# Patient Record
Sex: MALE | Race: OTHER | HISPANIC OR LATINO | URBAN - METROPOLITAN AREA
[De-identification: names, ages, dates, MRNs, and addresses within clinical notes are randomized per-mention and may not be internally consistent; named-entity substitution may affect disease eponyms.]

---

## 2019-12-27 ENCOUNTER — EMERGENCY (EMERGENCY)
Facility: HOSPITAL | Age: 29
LOS: 1 days | Discharge: HOME | End: 2019-12-27
Attending: EMERGENCY MEDICINE
Payer: MEDICAID

## 2019-12-27 VITALS — HEIGHT: 71 IN | WEIGHT: 190.04 LBS

## 2019-12-27 VITALS
HEART RATE: 69 BPM | RESPIRATION RATE: 18 BRPM | OXYGEN SATURATION: 99 % | TEMPERATURE: 98 F | DIASTOLIC BLOOD PRESSURE: 80 MMHG | SYSTOLIC BLOOD PRESSURE: 131 MMHG

## 2019-12-27 PROCEDURE — 99282 EMERGENCY DEPT VISIT SF MDM: CPT

## 2019-12-27 NOTE — ED STATDOCS - OBJECTIVE STATEMENT
28 y/o M pt with no significant PMHx presents to the ED c/o left knee pain s/p knee sprain playing soccer a couple months ago. Patient had an X-ray done and was recommended PT. Patient states it worked but reports hurting his knee again recently when he missed a step. Patient has an appt with a specialist next year but states he has increasing pain that is radiating to his lower back.

## 2019-12-27 NOTE — ED STATDOCS - PATIENT PORTAL LINK FT
You can access the FollowMyHealth Patient Portal offered by [facility] by registering at the following website: [email or web address]. By joining [employer]’s FollowMyHealth portal, you will also be able to view your health information using other applications (apps) compatible with our system.

## 2019-12-27 NOTE — ED STATDOCS - PHYSICAL EXAMINATION
Gen: NAD, AOx3  Head: NCAT  HEENT: PERRL, EOMI, oral mucosa moist, normal conjunctiva, neck supple  Lung: CTAB, no respiratory distress  CV: rrr, no murmur, Normal perfusion  Abd: soft, NTND, no CVA tenderness  MSK: No edema, no visible deformities  Neuro: No focal neurologic deficits  Skin: No rash   Psych: normal affect Gen: NAD, AOx3  Head: NCAT  HEENT: EOMI, oral mucosa moist, normal conjunctiva, neck supple  Lung: no respiratory distress  CV:  Normal perfusion  MSK: No edema, no visible deformities, rt knee +lateral joint line ttp without effusion FROM mild antalgic gait  Neuro: No focal neurologic deficits  Skin: No rash   Psych: normal affect

## 2019-12-27 NOTE — ED STATDOCS - NSFOLLOWUPINSTRUCTIONS_ED_ALL_ED_FT
1. Return to ED for worsening, progressive or any other concerning symptoms   2. Follow up with your primary care doctor in 2-3days   3. Take motrin 600mg every 6 hours as needed for pain and Take Tylenol up to 1000 mg every 6 hours as needed for pain.   4. Rest, apply ice over covered skin for no more than 15 minutes at a time, keep affected extremity elevated  5. Follow up with orthopedic for possible MRI

## 2019-12-27 NOTE — ED ADULT TRIAGE NOTE - CHIEF COMPLAINT QUOTE
Right knee pain for past several weeks, was seen in urgent care and received x-ray, was told it was a sprain, states worsening pain, follow up appointment with Dr Hernandez not for another couple weeks

## 2019-12-27 NOTE — ED STATDOCS - NS_ ATTENDINGSCRIBEDETAILS _ED_A_ED_FT
I, Carmen Curtis, performed the initial face to face bedside interview with this patient regarding history of present illness, review of symptoms and relevant past medical, social and family history.  I completed an independent physical examination.  The history, relevant review of systems, past medical and surgical history, medical decision making, and physical examination was documented by the scribe in my presence and I attest to the accuracy of the documentation.

## 2019-12-27 NOTE — ED STATDOCS - CLINICAL SUMMARY MEDICAL DECISION MAKING FREE TEXT BOX
chronic knee pain with recent re-injury, no concerning exam findings, has outpt ortho apt. has not been taking motrin/tylenol, now with back pain from limping. neuro intact. recommend motrin/tylenol/ice and continued outpt Follow up

## 2020-01-10 PROBLEM — Z00.00 ENCOUNTER FOR PREVENTIVE HEALTH EXAMINATION: Status: ACTIVE | Noted: 2020-01-10

## 2020-01-13 ENCOUNTER — APPOINTMENT (OUTPATIENT)
Dept: ORTHOPEDIC SURGERY | Facility: CLINIC | Age: 30
End: 2020-01-13
Payer: MEDICAID

## 2020-01-13 VITALS
SYSTOLIC BLOOD PRESSURE: 130 MMHG | BODY MASS INDEX: 26.6 KG/M2 | DIASTOLIC BLOOD PRESSURE: 77 MMHG | HEIGHT: 71 IN | HEART RATE: 71 BPM | WEIGHT: 190 LBS | TEMPERATURE: 98.1 F

## 2020-01-13 DIAGNOSIS — M25.561 PAIN IN RIGHT KNEE: ICD-10-CM

## 2020-01-13 DIAGNOSIS — Z78.9 OTHER SPECIFIED HEALTH STATUS: ICD-10-CM

## 2020-01-13 PROCEDURE — 99204 OFFICE O/P NEW MOD 45 MIN: CPT

## 2020-01-13 PROCEDURE — 73562 X-RAY EXAM OF KNEE 3: CPT | Mod: RT

## 2020-01-13 NOTE — PHYSICAL EXAM
[Normal] : Gait: normal [LE] : Sensory: Intact in bilateral lower extremities [ALL] : Biceps, brachioradialis, triceps, patellar, ankle and plantar 2+ and symmetric bilaterally [Antalgic] : not antalgic [de-identified] : Right knee exam shows mild joint effusion he has full range of motion 0-140°with pain at terminal flexion he has laxity with anterior drawer test, positive Lachman's test.  [de-identified] : GENERAL APPEARANCE: Well nourished and hydrated, pleasant, alert, and oriented x 3. Appears their stated age. \par HEENT: Normocephalic, extraocular eye motion intact. Nasal septum midline. Oral cavity clear. External auditory canal clear. \par RESPIRATORY: Breath sounds clear and audible in all lobes. No wheezing, No accessory muscle use.\par CARDIOVASCULAR: No apparent abnormalities. No lower leg edema. No varicosities. Pedal pulses are palpable.\par NEUROLOGIC: Sensation is normal, no muscle weakness in the upper or lower extremities.\par DERMATOLOGIC: No apparent skin lesions, moist, warm, no rash.\par SPINE: Cervical spine appears normal and moves freely; thoracic spine appears normal and moves freely; lumbosacral spine appears normal and moves freely, normal, nontender.\par MUSCULOSKELETAL: Hands, wrists, and elbows are normal and move freely, shoulders are normal and move freely.  [de-identified] : 3V Xray of the right knee done in office today and reviewed by Dr. Emiliano Hernandez shows well-preserved joint without findings or any evidence of fracture, dislocation, or any other acute orthopedic pathology. There is no radiographic features of severe OA present. \par \par \par

## 2020-01-13 NOTE — ADDENDUM
[FreeTextEntry1] : I, Ilya Mcgraw, acted solely as a scribe for Dr. Emiliano Hernandez on this date 01/13/2020.

## 2020-01-13 NOTE — DISCUSSION/SUMMARY
[de-identified] : Based on his physical exam findings and history of injury we will obtain an MRI of the right knee to rule-out ACL tear. Based on MRI result he may be a candidate for right knee ACL reconstruction. He will follow-up with Dr. Lan after MRI result is available.

## 2020-01-13 NOTE — HISTORY OF PRESENT ILLNESS
[4] : a current pain level of 4/10 [___ mths] : [unfilled] month(s) ago [Bending] : worsened by bending [Rest] : relieved by rest [Pain Location] : pain [Stable] : stable [Standing] : standing [Intermit.] : ~He/She~ states the symptoms seem to be intermittent [Walking] : worsened by walking [Knee Flexion] : worsened with knee flexion [Knee Extension] : worsened with knee extension [Acetaminophen] : relieved by acetaminophen [de-identified] : 29 year old male here for evaluation of right knee pain. The patient is having pain in the right knee for the past 5 months after a twisting injury during soccer. He was running and he felt his tibia buckle inwards. he had severe pain and swelling and could not walk well after injury. He has been limping putting all his weight on his left side.\par  Patient was placed in physical therapy after obtaining x-ray by primary care physician. He was improving after 2 months of PT. however he noted increased pain after misstepped coming down stairs. His pain is in medial and lateral anterior knee. he is taking Tylenol for pain. He does report intermittent locking of the right knee. \par

## 2020-01-15 ENCOUNTER — FORM ENCOUNTER (OUTPATIENT)
Age: 30
End: 2020-01-15

## 2020-01-16 ENCOUNTER — OUTPATIENT (OUTPATIENT)
Dept: OUTPATIENT SERVICES | Facility: HOSPITAL | Age: 30
LOS: 1 days | End: 2020-01-16
Payer: MEDICAID

## 2020-01-16 ENCOUNTER — APPOINTMENT (OUTPATIENT)
Dept: MRI IMAGING | Facility: CLINIC | Age: 30
End: 2020-01-16
Payer: MEDICAID

## 2020-01-16 DIAGNOSIS — S83.511A SPRAIN OF ANTERIOR CRUCIATE LIGAMENT OF RIGHT KNEE, INITIAL ENCOUNTER: ICD-10-CM

## 2020-01-16 PROCEDURE — 73721 MRI JNT OF LWR EXTRE W/O DYE: CPT | Mod: 26,RT

## 2020-01-16 PROCEDURE — 73721 MRI JNT OF LWR EXTRE W/O DYE: CPT

## 2020-02-13 ENCOUNTER — APPOINTMENT (OUTPATIENT)
Dept: ORTHOPEDIC SURGERY | Facility: CLINIC | Age: 30
End: 2020-02-13

## 2020-08-07 ENCOUNTER — APPOINTMENT (OUTPATIENT)
Dept: ORTHOPEDIC SURGERY | Facility: CLINIC | Age: 30
End: 2020-08-07
Payer: MEDICAID

## 2020-08-07 VITALS
DIASTOLIC BLOOD PRESSURE: 80 MMHG | HEART RATE: 75 BPM | BODY MASS INDEX: 26.6 KG/M2 | HEIGHT: 71 IN | SYSTOLIC BLOOD PRESSURE: 127 MMHG | WEIGHT: 190 LBS | TEMPERATURE: 98.3 F

## 2020-08-07 PROCEDURE — 99214 OFFICE O/P EST MOD 30 MIN: CPT

## 2020-08-07 PROCEDURE — 73562 X-RAY EXAM OF KNEE 3: CPT | Mod: RT

## 2020-10-02 ENCOUNTER — APPOINTMENT (OUTPATIENT)
Dept: ORTHOPEDIC SURGERY | Facility: AMBULATORY MEDICAL SERVICES | Age: 30
End: 2020-10-02
Payer: MEDICAID

## 2020-10-02 PROCEDURE — 29888 ARTHRS AID ACL RPR/AGMNTJ: CPT | Mod: RT

## 2020-10-02 RX ORDER — OXYCODONE 5 MG/1
5 TABLET ORAL
Qty: 30 | Refills: 0 | Status: COMPLETED | COMMUNITY
Start: 2020-10-02 | End: 2020-10-09

## 2020-10-02 RX ORDER — ASPIRIN 325 MG/1
325 TABLET ORAL
Qty: 28 | Refills: 0 | Status: COMPLETED | COMMUNITY
Start: 2020-10-02 | End: 2020-10-30

## 2020-10-02 RX ORDER — ACETAMINOPHEN 500 MG/1
500 TABLET ORAL
Qty: 120 | Refills: 0 | Status: COMPLETED | COMMUNITY
Start: 2020-10-02 | End: 2020-10-22

## 2020-10-02 RX ORDER — ONDANSETRON 4 MG/1
4 TABLET ORAL
Qty: 30 | Refills: 0 | Status: COMPLETED | COMMUNITY
Start: 2020-10-02 | End: 2020-10-07

## 2020-10-12 ENCOUNTER — APPOINTMENT (OUTPATIENT)
Dept: ORTHOPEDIC SURGERY | Facility: CLINIC | Age: 30
End: 2020-10-12
Payer: MEDICAID

## 2020-10-12 PROCEDURE — 99024 POSTOP FOLLOW-UP VISIT: CPT

## 2020-10-12 PROCEDURE — 73560 X-RAY EXAM OF KNEE 1 OR 2: CPT | Mod: RT

## 2020-10-14 NOTE — HISTORY OF PRESENT ILLNESS
[___ Days Post Op] : post op day #[unfilled] [Clean/Dry/Intact] : clean, dry and intact [Neuro Intact] : an unremarkable neurological exam [Vascular Intact] : ~T peripheral vascular exam normal [Negative Savita's] : maneuvers demonstrated a negative Savita's sign [Xray (Date:___)] : [unfilled] Xray -  [Doing Well] : is doing well [Chills] : no chills [Fever] : no fever [Nausea] : no nausea [Vomiting] : no vomiting [Healed] : not healed [Erythema] : not erythematous [Discharge] : absent of discharge [Swelling] : not swollen [Dehiscence] : not dehisced [de-identified] : S/P R knee ACLr with quad tendon autograft, meniscus repair. DOS: 10/2/2020. [de-identified] : RIVKA is a 30 year old male who presents today S/P right knee ACLr with quad tendon autograft, meniscus repair, 10 days ago.  He presents wearing his knee brace locked in extension and PWB with assistance of crutches.  He demonstrates adequate pain control and only takes Tylenol for pain.  He began PT.  No negative sequelae reported.\par  [de-identified] : Right Knee\par \par The patient is A+Ox3, healthy appearing, and free of fever or chills. Examination reveals a well appearing surgical scar that is dry, clean and intact. There is no erythema but there is moderate effusion without warmth and mild ecchymosis throughout the leg. Knee motion is 0 - 45 degrees of passive ROM, straight leg raise with 4 degree extension lag and pain free. Weakened quad strength. Full sensation intact and dorsalis pedis pulses 2+.\par \par Straight leg raise with lag\par \par Special Tests: NEG Lachman, NEG anterior drawer, NEG posterior drawer with collateral testing and varus/valgus normal. [de-identified] : 2 views of the right knee were performed today and are available for me to review.  They were discussed with the patient and demonstrate adequate positioning of the suture buttons.  No fracture, no dislocation, no deformities.\par  [de-identified] : RIVKA is a 30 year male who is doing extremely well and is performing PT 2x/week without complaints. Surgical sites are clean and dry without warmth or erythema, pt denies fever or chills. He is to continue PT starting 3x/week for 4 more weeks until we see him again for re-assessment at the 2nd post-op appointment. He was given a new script for more aggressive PT and will increase the amount he goes per week at this time to increase flexion to prevent stiffness. May continue to use tylenol prn for pain. Due to lag on exam today he is to continue with her knee immobilizer in full extension with all weightbearing activities. He may unlock it up to 90 degrees with nonweightbearing activity and will progress with PT as they feel necessary. He agrees to the latter plan and does not have any further questions or concerns.

## 2020-11-09 ENCOUNTER — APPOINTMENT (OUTPATIENT)
Dept: ORTHOPEDIC SURGERY | Facility: CLINIC | Age: 30
End: 2020-11-09

## 2020-11-13 ENCOUNTER — APPOINTMENT (OUTPATIENT)
Dept: ORTHOPEDIC SURGERY | Facility: CLINIC | Age: 30
End: 2020-11-13
Payer: MEDICAID

## 2020-11-13 PROCEDURE — 99024 POSTOP FOLLOW-UP VISIT: CPT

## 2020-11-13 NOTE — HISTORY OF PRESENT ILLNESS
[___ Days Post Op] : post op day #[unfilled] [Chills] : no chills [Fever] : no fever [Nausea] : no nausea [Vomiting] : no vomiting [Clean/Dry/Intact] : clean, dry and intact [Healed] : not healed [Erythema] : not erythematous [Discharge] : absent of discharge [Swelling] : not swollen [Dehiscence] : not dehisced [Neuro Intact] : an unremarkable neurological exam [Vascular Intact] : ~T peripheral vascular exam normal [Negative Savita's] : maneuvers demonstrated a negative Savita's sign [Doing Well] : is doing well [de-identified] : S/P R knee ACLr with quad tendon autograft, meniscus repair. DOS: 10/2/2020. [de-identified] : RIVKA is a 30 year old male who presents today S/P right knee ACLr with quad tendon autograft, meniscus repair, 6 weeks ago.  He presents wearing his knee brace locked in extension and PWB with assistance of crutches.  He demonstrates adequate pain control and only takes Tylenol for pain.  He began PT.  No negative sequelae reported. He has been performing straight leg raises with weights with his therapist and has been on a stationary bike. He denies new injury or mechanical symptoms. He does admit to walking around the house without his crutches and doing well. He no longer sleeps with the brace on.\par  [de-identified] : Right Knee\par \par The patient is A+Ox3, healthy appearing, and free of fever or chills. Examination reveals a well appearing surgical scars that are dry, clean and intact. There is no erythema but there is mild effusion without warmth and  no ecchymosis throughout the leg. Knee motion is 0 - 120 degrees of active ROM, straight leg raise without extension lag and pain free. Weakened quad strength, mild. Full sensation intact and dorsalis pedis pulses 2+.\par \par Special Tests: NEG Lachman, NEG anterior drawer, NEG posterior drawer with collateral testing and varus/valgus normal. [de-identified] : No imaging today [de-identified] : RIVKA is a 30 year male who is doing extremely well and is performing PT 2x/week without complaints. Surgical sites are clean and dry without warmth or erythema, pt denies fever or chills. He is to continue PT starting 3x/week for 6 more weeks until we see him again for re-assessment at the 3rd post-op appointment. We gave him a new PT script to continue to advance as tolerated by the patient. Now able to flex past 90 degrees with his therapist. He may unlock his brace at home when he is not ambulating as well and may continue to not sleep with the brace on. May continue to use tylenol prn for pain.  He agrees to the latter plan and does not have any further questions or concerns.

## 2020-12-21 ENCOUNTER — APPOINTMENT (OUTPATIENT)
Dept: ORTHOPEDIC SURGERY | Facility: CLINIC | Age: 30
End: 2020-12-21
Payer: MEDICAID

## 2020-12-21 PROCEDURE — 99024 POSTOP FOLLOW-UP VISIT: CPT

## 2020-12-24 NOTE — HISTORY OF PRESENT ILLNESS
[___ Days Post Op] : post op day #[unfilled] [Clean/Dry/Intact] : clean, dry and intact [Neuro Intact] : an unremarkable neurological exam [Vascular Intact] : ~T peripheral vascular exam normal [Negative Savita's] : maneuvers demonstrated a negative Savita's sign [Doing Well] : is doing well [Chills] : no chills [Fever] : no fever [Nausea] : no nausea [Vomiting] : no vomiting [Healed] : not healed [Erythema] : not erythematous [Discharge] : absent of discharge [Swelling] : not swollen [Dehiscence] : not dehisced [de-identified] : S/P R knee ACLr with quad tendon autograft, meniscus repair. DOS: 10/2/2020. [de-identified] : RIVKA is a 30 year old male who presents today S/P right knee ACLr with quad tendon autograft, meniscus repair, 6 weeks ago.  He presents wearing his knee brace unlocked and FWB.  Patient only wears brace when leaving the house.  He demonstrates adequate pain control and only takes Tylenol for pain.  He began PT. He notes popping with his exercises at PT.  He feels like the pop is inside the joint and to the posterior knee.  It is not a painful pop.  He denies use of pain medications.  Overall he is doing well.  [de-identified] : Right Knee\par \par The patient is A+Ox3, healthy appearing, and free of fever or chills. Examination reveals a well appearing surgical scars that are dry, clean and intact. There is no erythema but there is mild effusion without warmth and  no ecchymosis throughout the leg. Knee motion is 0 - 140 degrees of active ROM, straight leg raise without extension lag and pain free. Weakened quad strength, mild. Full sensation intact and dorsalis pedis pulses 2+.\par \par Special Tests: NEG Lachman, NEG anterior drawer, NEG posterior drawer with collateral testing and varus/valgus normal. [de-identified] : No imaging today [de-identified] : RIVKA is a 30 year male who is doing extremely well and is performing PT 2x/week without complaints. Surgical sites are clean and dry without warmth or erythema, pt denies fever or chills. He is to continue PT starting 3x/week for 6 more weeks until we see him again for re-assessment at the 3rd post-op appointment. We gave him a new PT script to continue to advance as tolerated by the patient. Now able to flex past 90 degrees with his therapist.  He is doing very well.  He has no instability.  He may return to work as .  I will see him back in 3 months for clinical reassessment.  He agrees with above plan all questions were answered.

## 2021-03-15 ENCOUNTER — APPOINTMENT (OUTPATIENT)
Dept: ORTHOPEDIC SURGERY | Facility: CLINIC | Age: 31
End: 2021-03-15
Payer: MEDICAID

## 2021-03-15 ENCOUNTER — TRANSCRIPTION ENCOUNTER (OUTPATIENT)
Age: 31
End: 2021-03-15

## 2021-03-15 VITALS
WEIGHT: 190 LBS | DIASTOLIC BLOOD PRESSURE: 78 MMHG | HEART RATE: 97 BPM | BODY MASS INDEX: 26.6 KG/M2 | SYSTOLIC BLOOD PRESSURE: 139 MMHG | OXYGEN SATURATION: 98 % | HEIGHT: 71 IN

## 2021-03-15 PROCEDURE — 99213 OFFICE O/P EST LOW 20 MIN: CPT

## 2021-03-15 PROCEDURE — 99072 ADDL SUPL MATRL&STAF TM PHE: CPT

## 2021-03-15 NOTE — DISCUSSION/SUMMARY
[de-identified] : Yonis is a 22-year-old male status post right knee ACL reconstruction.  He is 5 months out from surgery.  He is doing very well.  He has no instability of his knee.  He has no weakness.  He does complain of some mild pain with putting direct contact of his kneecap to the floor.  He is very happy with his progress.  He has not started running on the treadmill although is walking backwards on a treadmill.  At this time I recommend running on a treadmill.  Also make it recommend cutting jumping and other plyometric activities.  I will see him back in 4 months for clinical reassessment.  If at that time he is doing well with no instability will do return to play testing.  Will likely return to soccer at that time pending test results.  He demonstrates understanding the plan all questions were answered.

## 2021-03-15 NOTE — PHYSICAL EXAM
[de-identified] : Physical Exam:\par General: Well appearing, no acute distress, A&O\par Neurologic: A&Ox3, No focal deficits\par Head: NCAT without abrasions, lacerations, or ecchymosis to head, face, or scalp\par Eyes: No scleral icterus, no gross abnormalities\par Respiratory: Equal chest wall expansion bilaterally, no accessory muscle use\par Lymphatic: No lymphadenopathy palpated\par Skin: Warm and dry\par Psychiatric: Normal mood and affect.\par \par \par Right Knee\par \par The patient is A+Ox3, healthy appearing, and free of fever or chills. Examination reveals a well appearing surgical scars that are dry, clean and intact. There is no erythema but there is mild effusion without warmth and no ecchymosis throughout the leg. Knee motion is 0 - 140 degrees of active ROM, straight leg raise without extension lag and pain free. Weakened quad strength, mild. Full sensation intact and dorsalis pedis pulses 2+.\par \par Special Tests: NEG Lachman, NEG anterior drawer, NEG posterior drawer with collateral testing and varus/valgus normal. The surgical incision site(s) was not healed, but clean, dry and intact, not erythematous, absent of discharge, not swollen and not dehisced. Additional findings included an unremarkable neurological exam, peripheral vascular exam normal and maneuvers demonstrated a negative Savita's sign. \par

## 2021-03-15 NOTE — HISTORY OF PRESENT ILLNESS
[Improving] : improving [___ mths] : [unfilled] month(s) ago [1] : a current pain level of 1/10 [2] : an average pain level of 2/10 [Bending] : worsened by bending [de-identified] : RIVKA MAYNARD is a 30 year male being seen for f/u  R knee ACLr with quad tendon autograft, meniscus repair, 5 mos ago. He reports he returned to work on 01/04/2021. He reports intermittent painful popping in his R knee. He additionally reports some resistance when walking. He admits to going to PT 2x/week with relief. He endorses numbness and tingling over R shin. He reports taking ibuprofen for knee pain. \par

## 2021-03-15 NOTE — REVIEW OF SYSTEMS
[Negative] : Heme/Lymph [FreeTextEntry9] : S/P R knee ACLr with quad tendon autograft, meniscus repair. DOS: 10/2/2020.

## 2021-03-15 NOTE — REASON FOR VISIT
[Follow-Up Visit] : a follow-up visit for [Aftercare Following Surgery] : aftercare following surgery [FreeTextEntry2] : S/P R knee ACLr with quad tendon autograft, meniscus repair. DOS: 10/2/2020.

## 2021-07-12 ENCOUNTER — APPOINTMENT (OUTPATIENT)
Dept: ORTHOPEDIC SURGERY | Facility: CLINIC | Age: 31
End: 2021-07-12
Payer: MEDICAID

## 2021-07-12 VITALS
HEART RATE: 69 BPM | HEIGHT: 71 IN | SYSTOLIC BLOOD PRESSURE: 127 MMHG | DIASTOLIC BLOOD PRESSURE: 68 MMHG | WEIGHT: 210 LBS | BODY MASS INDEX: 29.4 KG/M2

## 2021-07-12 PROCEDURE — 99214 OFFICE O/P EST MOD 30 MIN: CPT

## 2021-07-12 NOTE — ADDENDUM
[FreeTextEntry1] : Documented by Misha Serarno acting as a scribe for Dr. Arroyo on 07/12/2021. \par \par All medical record entries made by the Scribe were at my, Dr. Arroyo's, direction and\par personally dictated by me on 07/12/2021. I have reviewed the chart and agree that the record\par accurately reflects my personal performance of the history, physical exam, procedure and imaging.

## 2021-07-12 NOTE — PHYSICAL EXAM
[de-identified] : Physical Exam:\par General: Well appearing, no acute distress, A&O\par Neurologic: A&Ox3, No focal deficits\par Head: NCAT without abrasions, lacerations, or ecchymosis to head, face, or scalp\par Eyes: No scleral icterus, no gross abnormalities\par Respiratory: Equal chest wall expansion bilaterally, no accessory muscle use\par Lymphatic: No lymphadenopathy palpated\par Skin: Warm and dry\par Psychiatric: Normal mood and affect.\par \par \par Right Knee\par \par The patient is A+Ox3, healthy appearing, and free of fever or chills. Examination reveals a well appearing surgical scars that are dry, clean and intact. There is no erythema but there is mild effusion without warmth and no ecchymosis throughout the leg. Knee motion is 0 - 140 degrees of active ROM, straight leg raise without extension lag and pain free. Good quad strength, mild. Full sensation intact and dorsalis pedis pulses 2+.\par \par Special Tests: NEG Lachman, NEG anterior drawer, NEG posterior drawer with collateral testing and varus/valgus normal. The surgical incision site(s) was not healed, but clean, dry and intact, not erythematous, absent of discharge, not swollen and not dehisced. Additional findings included an unremarkable neurological exam, peripheral vascular exam normal and maneuvers demonstrated a negative Savita's sign. \par

## 2021-07-12 NOTE — HISTORY OF PRESENT ILLNESS
[Improving] : improving [___ mths] : [unfilled] month(s) ago [1] : a current pain level of 1/10 [2] : an average pain level of 2/10 [Bending] : worsened by bending [de-identified] : RIVKA MAYNARD is a 30 year male being seen for f/u  R knee ACLr with quad tendon autograft, meniscus repair, 9 months ago. Currently, he reports mild soreness, pain. He stopped PT 2 months ago, due to his job. Otherwise, he is doing very well.

## 2021-07-12 NOTE — DISCUSSION/SUMMARY
[de-identified] : RIVKA MAYNARD is a 30 year male being seen for f/u  R knee ACLr with quad tendon autograft, meniscus repair, 9 months ago. Currently, he reports mild soreness, pain. He stopped PT 2 months ago, due to his job. Otherwise, he is doing very well. \par \par At this point, patient ROM has greatly improved, and his pain has subsided, he is doing well and happy with his progress so far. Given residual soreness and pain, patient was given prescription of formal physical therapy that he will perform 2x/wk for 6-8 wks. A return to play packet was provided to patient. Patient will follow up in 6-8 wks for repeat clinical assessment. All questions were answered and the patient verbalized understanding. The patient is in agreement with this treatment plan.

## 2021-09-13 ENCOUNTER — APPOINTMENT (OUTPATIENT)
Dept: ORTHOPEDIC SURGERY | Facility: CLINIC | Age: 31
End: 2021-09-13
Payer: MEDICAID

## 2021-09-13 VITALS
DIASTOLIC BLOOD PRESSURE: 85 MMHG | HEART RATE: 78 BPM | HEIGHT: 71 IN | BODY MASS INDEX: 29.4 KG/M2 | SYSTOLIC BLOOD PRESSURE: 128 MMHG | WEIGHT: 210 LBS

## 2021-09-13 DIAGNOSIS — S83.511A SPRAIN OF ANTERIOR CRUCIATE LIGAMENT OF RIGHT KNEE, INITIAL ENCOUNTER: ICD-10-CM

## 2021-09-13 DIAGNOSIS — Z98.890 OTHER SPECIFIED POSTPROCEDURAL STATES: ICD-10-CM

## 2021-09-13 DIAGNOSIS — S83.241S OTHER TEAR OF MEDIAL MENISCUS, CURRENT INJURY, RIGHT KNEE, SEQUELA: ICD-10-CM

## 2021-09-13 PROCEDURE — 99214 OFFICE O/P EST MOD 30 MIN: CPT

## 2021-09-13 NOTE — PHYSICAL EXAM
[de-identified] : Physical Exam:\par General: Well appearing, no acute distress, A&O\par Neurologic: A&Ox3, No focal deficits\par Head: NCAT without abrasions, lacerations, or ecchymosis to head, face, or scalp\par Eyes: No scleral icterus, no gross abnormalities\par Respiratory: Equal chest wall expansion bilaterally, no accessory muscle use\par Lymphatic: No lymphadenopathy palpated\par Skin: Warm and dry\par Psychiatric: Normal mood and affect.\par \par \par Right Knee\par \par The patient is A+Ox3, healthy appearing, and free of fever or chills. Examination reveals a well appearing surgical scars that are dry, clean and intact.  Knee motion is 0 - 140 degrees of active ROM, straight leg raise without extension lag and pain free. Good quad strength, mild. Full sensation intact and dorsalis pedis pulses 2+.\par \par Special Tests: NEG Lachman, NEG anterior drawer, NEG posterior drawer with collateral testing and varus/valgus normal. The surgical incision site(s) was not healed, but clean, dry and intact, not erythematous, absent of discharge, not swollen and not dehisced. Additional findings included an unremarkable neurological exam, peripheral vascular exam normal and maneuvers demonstrated a negative Savita's sign. \par

## 2021-09-13 NOTE — DISCUSSION/SUMMARY
[de-identified] : RIVKA MAYNARD is a 30 year male being seen for f/u  R knee ACLr with quad tendon autograft, meniscus repair, 11 months ago. Currently, he reports able to play through game though with mild soreness during it. Otherwise doing well. \par \par At this point, patient ROM has greatly improved, and his pain has subsided, he is doing well and happy with his progress so far. Given his progress, he may transition from PT, but should perform HEP daily. Conservative measures of treatment include rest until asymptomatic, activity avoidance, NSAID's PRN, application to ice to the area 2-3x daily for 20 minutes, with gradual return to activities. Patient will follow up in 1 yr for repeat clinical assessment. All questions were answered and the patient verbalized understanding. The patient is in agreement with this treatment plan.

## 2021-09-13 NOTE — ADDENDUM
[FreeTextEntry1] : Documented by Misha Serrano acting as a scribe for Dr. Arroyo on 09/13/2021. \par \par All medical record entries made by the Scribe were at my, Dr. Arroyo's, direction and\par personally dictated by me on 09/13/2021. I have reviewed the chart and agree that the record\par accurately reflects my personal performance of the history, physical exam, procedure and imaging.

## 2021-09-13 NOTE — HISTORY OF PRESENT ILLNESS
[Improving] : improving [___ mths] : [unfilled] month(s) ago [1] : a current pain level of 1/10 [2] : an average pain level of 2/10 [de-identified] : RIVKA MAYNARD is a 30 year male being seen for f/u  R knee ACLr with quad tendon autograft, meniscus repair, 11 months ago. Currently, he reports able to play through game though with mild soreness during it. Otherwise doing well.

## 2022-06-13 ENCOUNTER — EMERGENCY (EMERGENCY)
Facility: HOSPITAL | Age: 32
LOS: 1 days | Discharge: HOME | End: 2022-06-13
Attending: EMERGENCY MEDICINE
Payer: COMMERCIAL

## 2022-06-13 VITALS
SYSTOLIC BLOOD PRESSURE: 121 MMHG | HEART RATE: 76 BPM | TEMPERATURE: 98 F | DIASTOLIC BLOOD PRESSURE: 78 MMHG | HEIGHT: 71 IN | RESPIRATION RATE: 18 BRPM | OXYGEN SATURATION: 99 % | WEIGHT: 199.96 LBS

## 2022-06-13 PROCEDURE — 99283 EMERGENCY DEPT VISIT LOW MDM: CPT

## 2022-06-13 PROCEDURE — 99284 EMERGENCY DEPT VISIT MOD MDM: CPT

## 2022-06-13 RX ORDER — LIDOCAINE 4 G/100G
1 CREAM TOPICAL ONCE
Refills: 0 | Status: COMPLETED | OUTPATIENT
Start: 2022-06-13 | End: 2022-06-13

## 2022-06-13 RX ORDER — LIDOCAINE 4 G/100G
1 CREAM TOPICAL
Qty: 3 | Refills: 0
Start: 2022-06-13 | End: 2022-06-15

## 2022-06-13 RX ORDER — METHOCARBAMOL 500 MG/1
4 TABLET, FILM COATED ORAL
Qty: 32 | Refills: 0
Start: 2022-06-13 | End: 2022-06-16

## 2022-06-13 RX ORDER — METHOCARBAMOL 500 MG/1
1500 TABLET, FILM COATED ORAL ONCE
Refills: 0 | Status: COMPLETED | OUTPATIENT
Start: 2022-06-13 | End: 2022-06-13

## 2022-06-13 RX ORDER — IBUPROFEN 200 MG
600 TABLET ORAL ONCE
Refills: 0 | Status: COMPLETED | OUTPATIENT
Start: 2022-06-13 | End: 2022-06-13

## 2022-06-13 RX ADMIN — METHOCARBAMOL 1500 MILLIGRAM(S): 500 TABLET, FILM COATED ORAL at 18:32

## 2022-06-13 RX ADMIN — Medication 600 MILLIGRAM(S): at 18:32

## 2022-06-13 RX ADMIN — LIDOCAINE 1 PATCH: 4 CREAM TOPICAL at 18:32
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
## 2022-06-13 NOTE — ED STATDOCS - NS ED ROS FT
General: Denies fever, chills  MSK: + back pain  Resp: Denies cough, SOB  CV: Denies CP, palpitations  GI: Denies nausea, vomiting  Neuro: Denies numbness, tingling  Skin: Denies rashes, lacerations

## 2022-06-13 NOTE — ED STATDOCS - PATIENT PORTAL LINK FT
You can access the FollowMyHealth Patient Portal offered by [facility] by registering at the following website: [email or web address]. By joining [employer]’s FollowMyHealth portal, you will also be able to view your health information using other applications (apps) compatible with our system.
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
